# Patient Record
Sex: FEMALE | Race: WHITE | Employment: FULL TIME | URBAN - METROPOLITAN AREA
[De-identification: names, ages, dates, MRNs, and addresses within clinical notes are randomized per-mention and may not be internally consistent; named-entity substitution may affect disease eponyms.]

---

## 2020-08-20 ENCOUNTER — NURSE TRIAGE (OUTPATIENT)
Dept: FAMILY MEDICINE | Facility: OTHER | Age: 60
End: 2020-08-20

## 2020-08-20 NOTE — TELEPHONE ENCOUNTER
LM asking pt to return call. Please triage cat bite    Next 5 appointments (look out 90 days)    Aug 21, 2020  2:20 PM CDT  SHORT with FRANKLIN Reyes CNP  [facility] ([facility]) [address] [place] [id number]  [phone]        Angelika Ling, MSN, RN

## 2020-08-20 NOTE — PROGRESS NOTES
"Subjective     Maribell Gomez is a 60 year old female who presents to clinic today for the following health issues:    HPI     Patient here as she was bit by a cat yesterday at work. Has been keeping it really clean. No signs of infection. No pain     Right thumb small puncture, about the size of a pin needle.     Review of Systems   Constitutional: Negative.    Respiratory: Negative.    Cardiovascular: Negative.    Skin:        Puncture wound             Objective    /64 (BP Location: Right arm, Patient Position: Chair, Cuff Size: Adult Regular)   Pulse 98   Temp 97.7  F (36.5  C) (Temporal)   Resp 16   Ht 1.676 m (5' 6\")   Wt 74.4 kg (164 lb)   SpO2 100%   Breastfeeding No   BMI 26.47 kg/m    Body mass index is 26.47 kg/m .  Physical Exam  Musculoskeletal:      Right hand: She exhibits normal range of motion, no tenderness, no bony tenderness, normal capillary refill, no deformity, no laceration and no swelling. Normal sensation noted. Normal strength noted.        Hands:    Skin:     Comments: Small puncture wound along the palmar side of the right thumb. Area is with good coloration, skin appears to be healing without any redness or swelling. Sensation intact and no tenderness noted.             Labs pending         Assessment & Plan     Cat bite, initial encounter  - Area appears to very small puncture wound, about the side of a pin needle. Area is normal in color, no discharge or redness, no swelling noted.   - Advised to get Rabies screening as she asked for this today  - Recommend given location of bite and history of cat, Augmentin twice daily for 10 days.   - If any redness, swelling, fevers/chills or worsening symptoms go in to be evaluated.   - Rabies Antibody Screen Humans  - amoxicillin-clavulanate (AUGMENTIN) 875-125 MG tablet; Take 1 tablet by mouth 2 times daily         The patient indicates understanding of these issues and agrees with the plan.    Patient Instructions   - Start " antibiotics today and complete regimen   - Monitor for any worsening symptoms   - Will call you on your rabies  - follow up if any worsening symptoms.       FRANKLIN Ramey CNP  Questions or concerns please feel free to send me a [employer] message or call me  Phone : [phone]          No follow-ups on file.    FRANKLIN Ramey CNP  [employer]

## 2020-08-20 NOTE — TELEPHONE ENCOUNTER
Patient new to our system please triage to be sure appropriate for visit tomorrow and does not need to be seen today for cat bite.         FRANKLIN Ramey CNP  Questions or concerns please feel free to send me a [employer] message or call me  Phone : [phone]

## 2020-08-20 NOTE — TELEPHONE ENCOUNTER
Spoke with patient.  Was bite at 1145 this am at work. 2 little puncture wounds on her right thumb. Stopped bleeding shortly after.  Work requested that any cat bite they have to have it looked at.  The cat is not current on rabies. Not a nice looking mouth per the vet.  The family will monitor the cat for 10 days    She washed it 3 times and with alcohol and iodine.      Likes like a little cut. No redness or swelling.      Declines going to ED as she says that she has been vaccinated for rabies as she is a .  Would like visit in clinic to document that its not infected and get rabies titre.    Next 5 appointments (look out 90 days)    Aug 21, 2020  2:20 PM CDT  SHORT with FRANKLIN Reyes [facility] ([facility]) [address] [place] [address] [address]  [facility] [address]  [phone]        Yandel Morrison, CHIN, RN, PHN            Reason for Disposition    Any break in skin from BITE (e.g., cut, puncture, or scratch) and PET animal (e.g., dog, cat, or ferret) at risk for RABIES (e.g., sick, stray, unprovoked bite, developing country)    Additional Information    Negative: Any break in skin from BITE (e.g., cut, puncture, or scratch) and WILD animal at RISK FOR RABIES (e.g., bat, raccoon, vidales, skunk, coyote, other carnivores)    Negative: Major bleeding (actively dripping or spurting) that can't be stopped    Negative: Sounds like a life-threatening emergency to the triager    Protocols used: ANIMAL BITE-A-OH

## 2020-08-21 ENCOUNTER — OFFICE VISIT (OUTPATIENT)
Dept: FAMILY MEDICINE | Facility: OTHER | Age: 60
End: 2020-08-21
Payer: OTHER MISCELLANEOUS

## 2020-08-21 VITALS
RESPIRATION RATE: 16 BRPM | HEIGHT: 66 IN | DIASTOLIC BLOOD PRESSURE: 64 MMHG | BODY MASS INDEX: 26.36 KG/M2 | HEART RATE: 98 BPM | TEMPERATURE: 97.7 F | SYSTOLIC BLOOD PRESSURE: 120 MMHG | WEIGHT: 164 LBS | OXYGEN SATURATION: 100 %
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
## 2020-08-21 PROBLEM — F41.1 GENERALIZED ANXIETY DISORDER: Status: ACTIVE | Noted: 2020-01-01

## 2020-08-21 PROBLEM — L70.9 ACNE: Status: ACTIVE | Noted: 2020-02-25

## 2020-08-21 PROBLEM — N80.9 ENDOMETRIOSIS: Status: ACTIVE | Noted: 2020-02-25

## 2020-08-21 PROBLEM — J45.909 REACTIVE AIRWAY DISEASE: Status: ACTIVE | Noted: 2020-02-25

## 2020-08-21 PROBLEM — F34.1 DYSTHYMIC DISORDER: Status: ACTIVE | Noted: 2020-01-01

## 2020-08-21 PROBLEM — I34.1 MITRAL VALVE PROLAPSE: Status: ACTIVE | Noted: 2020-02-25

## 2020-08-21 PROBLEM — L71.9 ROSACEA: Status: ACTIVE | Noted: 2020-02-25

## 2020-08-21 PROBLEM — N97.9 INFERTILITY, FEMALE: Status: ACTIVE | Noted: 2020-02-25

## 2020-08-21 PROCEDURE — 86382 NEUTRALIZATION TEST VIRAL: CPT | Mod: 90 | Performed by: NURSE PRACTITIONER

## 2020-08-21 PROCEDURE — 99203 OFFICE O/P NEW LOW 30 MIN: CPT | Performed by: NURSE PRACTITIONER

## 2020-08-21 PROCEDURE — 99000 SPECIMEN HANDLING OFFICE-LAB: CPT | Performed by: NURSE PRACTITIONER

## 2020-08-21 RX ORDER — ELECTROLYTES/DEXTROSE
SOLUTION, ORAL ORAL
COMMUNITY

## 2020-08-21 RX ORDER — BIOTIN 10000 MCG
CAPSULE ORAL
COMMUNITY

## 2020-08-21 SDOH — HEALTH STABILITY: MENTAL HEALTH: HOW OFTEN DO YOU HAVE A DRINK CONTAINING ALCOHOL?: NEVER

## 2020-08-21 ASSESSMENT — ENCOUNTER SYMPTOMS
ROS SKIN COMMENTS: PUNCTURE WOUND
CONSTITUTIONAL NEGATIVE: 1
RESPIRATORY NEGATIVE: 1
CARDIOVASCULAR NEGATIVE: 1

## 2020-08-21 ASSESSMENT — MIFFLIN-ST. JEOR: SCORE: 1325.65

## 2020-08-21 NOTE — LETTER
[address]  [facility] [address]  Phone: [phone]    August 21, 2020        Maribell Gomez  [address] [facility] [address]          To whom it may concern:    RE: Maribell Gomez    Patient was seen and treated today at our clinic. She may return to work, keep wound covered and avoid infection.     Please contact me for questions or concerns.      Sincerely,        FRANKLIN Ramey CNP

## 2020-08-21 NOTE — PATIENT INSTRUCTIONS
- Start antibiotics today and complete regimen   - Monitor for any worsening symptoms   - Will call you on your rabies  - follow up if any worsening symptoms.       FRANKLIN Ramey CNP  Questions or concerns please feel free to send me a [employer] message or call me  Phone : [phone]

## 2020-09-11 ENCOUNTER — TELEPHONE (OUTPATIENT)
Dept: FAMILY MEDICINE | Facility: OTHER | Age: 60
End: 2020-09-11

## 2020-09-11 LAB — RABV NAB SPEC NT-ACNC: NORMAL

## 2020-09-11 NOTE — TELEPHONE ENCOUNTER
.Left message asking patient to return call.  Please inform patient of RESULTS from Provider below.       Please let patient know that her rabies is in the expected range as a response to her rabies vaccine.       FRANKLIN Ramey CNP

## 2020-09-11 NOTE — TELEPHONE ENCOUNTER
Attempted to reach patient about message below. Left VM to call clinic. Patient declined MyChart on 8- so we are unable to give results until she signs up. We can send a copy in the mean time?

## 2020-09-11 NOTE — TELEPHONE ENCOUNTER
Called patient about email request - not able to print flow sheet to send and do not have secure email. She can stop by clinic to  or we can mail to her.

## 2021-01-04 ENCOUNTER — HEALTH MAINTENANCE LETTER (OUTPATIENT)
Age: 61
End: 2021-01-04

## 2021-04-09 ENCOUNTER — TELEPHONE (OUTPATIENT)
Dept: FAMILY MEDICINE | Facility: OTHER | Age: 61
End: 2021-04-09

## 2021-04-09 NOTE — TELEPHONE ENCOUNTER
Patient Quality Outreach Summary      Summary:    Patient is due/failing the following:   Physical  with fasting labs, mammogram and colonoscopy   Type of outreach:    Phone, left message for patient/parent to call back.    Questions for provider review:    None                                                                                                                    Tamela Tong CMA       Chart routed to Care Team.

## 2021-04-09 NOTE — LETTER
[address]  [facility] [address]  Phone: [phone]  April 20, 2021      Maribell Gomez  [address] [address] [address]      Dear Maribell,    We care about your health and have reviewed your health plan including your medical conditions, medications, and lab results.  Based on this review, it is recommended that you follow up regarding the following health topic(s):  -Breast Cancer Screening  -Colon Cancer Screening  -Wellness (Physical) Visit     We recommend you take the following action(s):  -schedule a WELLNESS (Physical) APPOINTMENT.  We will perform the following labs: Lipids (fasting cholesterol - nothing to eat except water and/or meds for 8-10 hours).  -schedule a MAMMOGRAM which is due. Please disregard this reminder if you have had this exam elsewhere within the last 1-2 years please let us know so we can update your records.  -schedule a COLONOSCOPY to look for colon cancer (due every 10 years or 5 years in higher risk situations.)  Colonoscopies can prevent 90-95% of colon cancer deaths.  Problem lesions can be removed before they ever become cancer.  If you do not wish to do a colonoscopy or cannot afford to do one at this time, there is another option called a Fecal Immunochemical Occult Blood Test (FIT) a take home stool sample kit.  It does not replace the colonoscopy for colorectal cancer screening, but it can detect hidden bleeding in the lower colon.  It does need to be repeated every year and if a positive result is obtained, you would be referred for a colonoscopy.  If you have completed either one of these tests at another facility, please have the records sent to our clinic for our records.     Please call us at the [facility] - [phone] (or use [employer]) to address the above recommendations.     Thank you for trusting [facility] and we appreciate the opportunity to serve you.  We look forward to supporting  your healthcare needs in the future.    Healthy Regards,    Your Health Care Team  [facility]

## 2021-10-11 ENCOUNTER — HEALTH MAINTENANCE LETTER (OUTPATIENT)
Age: 61
End: 2021-10-11

## 2022-01-30 ENCOUNTER — HEALTH MAINTENANCE LETTER (OUTPATIENT)
Age: 62
End: 2022-01-30

## 2022-09-24 ENCOUNTER — HEALTH MAINTENANCE LETTER (OUTPATIENT)
Age: 62
End: 2022-09-24

## 2023-01-29 ENCOUNTER — HEALTH MAINTENANCE LETTER (OUTPATIENT)
Age: 63
End: 2023-01-29

## 2023-05-08 ENCOUNTER — HEALTH MAINTENANCE LETTER (OUTPATIENT)
Age: 63
End: 2023-05-08

## 2024-02-25 ENCOUNTER — HEALTH MAINTENANCE LETTER (OUTPATIENT)
Age: 64
End: 2024-02-25